# Patient Record
Sex: FEMALE | Race: WHITE | NOT HISPANIC OR LATINO | Employment: OTHER | URBAN - NONMETROPOLITAN AREA
[De-identification: names, ages, dates, MRNs, and addresses within clinical notes are randomized per-mention and may not be internally consistent; named-entity substitution may affect disease eponyms.]

---

## 2023-11-30 PROBLEM — H04.123 DRY EYES: Status: ACTIVE | Noted: 2023-07-21

## 2023-11-30 PROBLEM — E66.01 MORBID OBESITY DUE TO EXCESS CALORIES (MULTI): Status: ACTIVE | Noted: 2022-08-24

## 2023-11-30 PROBLEM — H35.3132 INTERMEDIATE STAGE NONEXUDATIVE AGE-RELATED MACULAR DEGENERATION OF BOTH EYES: Status: ACTIVE | Noted: 2023-07-21

## 2023-11-30 PROBLEM — J45.41 MODERATE PERSISTENT ASTHMA WITH EXACERBATION (HHS-HCC): Status: ACTIVE | Noted: 2023-06-16

## 2023-11-30 PROBLEM — Z94.7 HISTORY OF CORNEAL TRANSPLANT: Status: ACTIVE | Noted: 2023-11-17

## 2023-11-30 PROBLEM — M81.0 SENILE OSTEOPOROSIS: Status: ACTIVE | Noted: 2022-03-14

## 2023-11-30 PROBLEM — D25.9 FIBROID, UTERINE: Status: ACTIVE | Noted: 2023-06-16

## 2023-11-30 PROBLEM — E66.9 OBESITY: Status: ACTIVE | Noted: 2023-11-30

## 2023-11-30 PROBLEM — E78.5 HYPERLIPIDEMIA: Status: ACTIVE | Noted: 2017-04-24

## 2023-11-30 PROBLEM — F51.01 PRIMARY INSOMNIA: Status: ACTIVE | Noted: 2022-03-14

## 2023-11-30 PROBLEM — R53.82 CHRONIC FATIGUE: Status: ACTIVE | Noted: 2018-05-09

## 2023-11-30 PROBLEM — K64.9 HEMORRHOID: Status: ACTIVE | Noted: 2023-06-16

## 2023-11-30 PROBLEM — H18.513 FUCHS' CORNEAL DYSTROPHY OF BOTH EYES: Status: ACTIVE | Noted: 2023-07-21

## 2023-11-30 PROBLEM — E07.81 SICK-EUTHYROID SYNDROME: Status: ACTIVE | Noted: 2022-08-22

## 2023-11-30 PROBLEM — J30.2 SEASONAL ALLERGIC RHINITIS: Status: ACTIVE | Noted: 2023-06-16

## 2023-11-30 PROBLEM — J32.9 CHRONIC SINUSITIS: Status: ACTIVE | Noted: 2022-03-14

## 2023-11-30 PROBLEM — K21.9 GASTROESOPHAGEAL REFLUX DISEASE WITHOUT ESOPHAGITIS: Status: ACTIVE | Noted: 2022-03-14

## 2023-11-30 PROBLEM — Z96.1 PSEUDOPHAKIA: Status: ACTIVE | Noted: 2023-11-17

## 2023-11-30 PROBLEM — E55.9 VITAMIN D DEFICIENCY: Status: ACTIVE | Noted: 2023-06-16

## 2023-11-30 PROBLEM — E03.9 ACQUIRED HYPOTHYROIDISM: Status: ACTIVE | Noted: 2017-02-07

## 2023-11-30 PROBLEM — K58.2 IRRITABLE BOWEL SYNDROME WITH BOTH CONSTIPATION AND DIARRHEA: Status: ACTIVE | Noted: 2018-05-09

## 2023-11-30 PROBLEM — H25.13 AGE-RELATED NUCLEAR CATARACT OF BOTH EYES: Status: ACTIVE | Noted: 2023-07-21

## 2023-11-30 RX ORDER — MOMETASONE FUROATE AND FORMOTEROL FUMARATE DIHYDRATE 100; 5 UG/1; UG/1
2 AEROSOL RESPIRATORY (INHALATION) 2 TIMES DAILY
COMMUNITY
Start: 2018-11-20

## 2023-11-30 RX ORDER — OMEPRAZOLE 20 MG/1
1 CAPSULE, DELAYED RELEASE ORAL DAILY
COMMUNITY
Start: 2018-11-20 | End: 2023-12-01

## 2023-11-30 RX ORDER — CALCIUM CARBONATE 200(500)MG
TABLET,CHEWABLE ORAL
COMMUNITY
End: 2023-12-01

## 2023-11-30 RX ORDER — FLUTICASONE PROPIONATE AND SALMETEROL 500; 50 UG/1; UG/1
1 POWDER RESPIRATORY (INHALATION) 2 TIMES DAILY
COMMUNITY
Start: 2018-11-20

## 2023-11-30 RX ORDER — DILTIAZEM HYDROCHLORIDE 240 MG/1
240 CAPSULE, COATED, EXTENDED RELEASE ORAL DAILY
COMMUNITY
End: 2024-01-15 | Stop reason: SDUPTHER

## 2023-11-30 RX ORDER — VIT C/E/ZN/COPPR/LUTEIN/ZEAXAN 250MG-90MG
1000 CAPSULE ORAL
COMMUNITY
Start: 2018-11-20

## 2023-11-30 RX ORDER — LEVOTHYROXINE SODIUM 25 UG/1
1 TABLET ORAL 2 TIMES DAILY
COMMUNITY
Start: 2018-11-20

## 2023-11-30 RX ORDER — LEVALBUTEROL INHALATION SOLUTION 0.63 MG/3ML
1 SOLUTION RESPIRATORY (INHALATION)
COMMUNITY

## 2023-11-30 RX ORDER — LIOTHYRONINE SODIUM 5 UG/1
5 TABLET ORAL
COMMUNITY
Start: 2023-10-24

## 2023-12-01 ENCOUNTER — OFFICE VISIT (OUTPATIENT)
Dept: CARDIOLOGY | Facility: CLINIC | Age: 78
End: 2023-12-01
Payer: MEDICARE

## 2023-12-01 VITALS
HEIGHT: 64 IN | BODY MASS INDEX: 32.44 KG/M2 | HEART RATE: 80 BPM | SYSTOLIC BLOOD PRESSURE: 120 MMHG | DIASTOLIC BLOOD PRESSURE: 84 MMHG | WEIGHT: 190 LBS

## 2023-12-01 DIAGNOSIS — E66.9 CLASS 1 OBESITY WITHOUT SERIOUS COMORBIDITY WITH BODY MASS INDEX (BMI) OF 32.0 TO 32.9 IN ADULT, UNSPECIFIED OBESITY TYPE: ICD-10-CM

## 2023-12-01 DIAGNOSIS — E03.9 HYPOTHYROIDISM, UNSPECIFIED TYPE: Primary | ICD-10-CM

## 2023-12-01 DIAGNOSIS — I48.0 PAROXYSMAL ATRIAL FIBRILLATION (MULTI): ICD-10-CM

## 2023-12-01 DIAGNOSIS — E78.2 MIXED HYPERLIPIDEMIA: ICD-10-CM

## 2023-12-01 PROBLEM — E66.811 CLASS 1 OBESITY WITHOUT SERIOUS COMORBIDITY WITH BODY MASS INDEX (BMI) OF 32.0 TO 32.9 IN ADULT: Status: ACTIVE | Noted: 2023-11-30

## 2023-12-01 PROCEDURE — 99214 OFFICE O/P EST MOD 30 MIN: CPT | Performed by: INTERNAL MEDICINE

## 2023-12-01 PROCEDURE — 1159F MED LIST DOCD IN RCRD: CPT | Performed by: INTERNAL MEDICINE

## 2023-12-01 PROCEDURE — 1036F TOBACCO NON-USER: CPT | Performed by: INTERNAL MEDICINE

## 2023-12-01 RX ORDER — CYANOCOBALAMIN (VITAMIN B-12) 1000 MCG
200 TABLET, EXTENDED RELEASE ORAL DAILY
COMMUNITY

## 2023-12-01 RX ORDER — FLUTICASONE PROPIONATE 50 MCG
1 SPRAY, SUSPENSION (ML) NASAL DAILY
COMMUNITY

## 2023-12-01 RX ORDER — ACETAMINOPHEN 325 MG/1
TABLET ORAL EVERY 6 HOURS PRN
COMMUNITY

## 2023-12-01 RX ORDER — ZINC GLUCONATE 50 MG
50 TABLET ORAL DAILY
COMMUNITY

## 2023-12-01 RX ORDER — AZELASTINE 1 MG/ML
1 SPRAY, METERED NASAL 2 TIMES DAILY
COMMUNITY

## 2023-12-01 RX ORDER — MILK THISTLE 150 MG
1 CAPSULE ORAL DAILY
COMMUNITY

## 2023-12-01 RX ORDER — CALCIUM CARBONATE 300MG(750)
400 TABLET,CHEWABLE ORAL DAILY
COMMUNITY

## 2023-12-01 NOTE — PROGRESS NOTES
"Subjective   Alethea Poole is a 78 y.o. female       Chief Complaint    Follow-up          HPI   Patient is in the office for follow-up for the problems noted below.  She had recent left eye corneal transplant surgery.  Her asthma seems to be in remission presently.  She reports no symptoms of palpitations dyspnea or chest pain.  Her weight has dropped 12 pounds from last visit on purpose which was encouraged.  Her examination reveals regular rhythm which I believe is caused by atrial flutter with 4-1 AV conduction.  She is on Cardizem and Eliquis both of which have been well-tolerated.  Recent lab data from PCP were requested.    ASSESSMENT AND PLAN:      1. Paroxysmal atrial fibrillation status post radiofrequency ablation twice, the last in 2019 at [facility], patient has intermittent atrial flutter with variable AV conduction, we will continue rate control and anticoagulation with no plans for rhythm control.  2.  Class I obesity.  Her weight has been dropping nicely by lifestyle modification she made.  3. Controlled hypothyroidism, on medical therapy. Followed by Dr. russell in [place],   4. High-risk medication with Eliquis, no bleeding complications, CBC was requested from PCP  5. Asthma, currently stable and managed by pulmonary medicine        Review of Systems   All other systems reviewed and are negative.         Visit Vitals  /84 (BP Location: Left arm, Patient Position: Sitting)   Pulse 80   Ht 1.626 m (5' 4\")   Wt 86.2 kg (190 lb)   BMI 32.61 kg/m²   Smoking Status Former   BSA 1.97 m²        Objective   Physical Exam  Constitutional:       Appearance: Normal appearance. She is normal weight.   HENT:      Nose: Nose normal.   Neck:      Vascular: No carotid bruit.   Cardiovascular:      Rate and Rhythm: Normal rate.      Pulses: Normal pulses.      Heart sounds: Normal heart sounds.   Pulmonary:      Effort: Pulmonary effort is normal.   Abdominal:      General: Bowel sounds are " normal.      Palpations: Abdomen is soft.   Genitourinary:     Rectum: Normal.   Musculoskeletal:         General: Normal range of motion.      Cervical back: Normal range of motion.      Right lower leg: No edema.      Left lower leg: No edema.   Skin:     General: Skin is warm and dry.   Neurological:      General: No focal deficit present.      Mental Status: She is alert.   Psychiatric:         Mood and Affect: Mood normal.         Behavior: Behavior normal.         Thought Content: Thought content normal.         Judgment: Judgment normal.         Current Medications    Current Outpatient Medications:     acetaminophen (Tylenol) 325 mg tablet, Take by mouth every 6 hours if needed for mild pain (1 - 3)., Disp: , Rfl:     albuterol sulfate (Proair Digihaler) 90 mcg/actuation aero powdr breath act w/sensor inhaler, Inhale 2 puffs every 6 hours if needed for wheezing., Disp: , Rfl:     apixaban (Eliquis) 5 mg tablet, Take 1 tablet (5 mg) by mouth 2 times a day., Disp: , Rfl:     azelastine (Astelin) 137 mcg (0.1 %) nasal spray, Administer 1 spray into each nostril 2 times a day. Use in each nostril as directed, Disp: , Rfl:     cholecalciferol (Vitamin D-3) 25 MCG (1000 UT) capsule, Take 1 capsule (25 mcg) by mouth once daily., Disp: , Rfl:     dilTIAZem CD (Cardizem CD) 240 mg 24 hr capsule, Take 1 capsule (240 mg) by mouth once daily., Disp: , Rfl:     fluticasone (Flonase) 50 mcg/actuation nasal spray, Administer 1 spray into each nostril once daily. Shake gently. Before first use, prime pump. After use, clean tip and replace cap., Disp: , Rfl:     fluticasone propion-salmeteroL (Advair Diskus) 500-50 mcg/dose diskus inhaler, Inhale 1 puff twice a day., Disp: , Rfl:     levalbuterol (Xopenex) 0.63 mg/3 mL nebulizer solution, Take 1 ampule by nebulization 3 times a day., Disp: , Rfl:     levothyroxine (Synthroid, Levoxyl) 25 mcg tablet, Take 1 tablet (25 mcg) by mouth 2 times a day., Disp: , Rfl:      liothyronine (Cytomel) 5 mcg tablet, Take 1 tablet (5 mcg) by mouth 2 times a day with meals., Disp: , Rfl:     magnesium oxide (Mag-Ox) 400 mg tablet, Take 1 tablet (400 mg) by mouth once daily., Disp: , Rfl:     mometasone-formoterol (Dulera) 100-5 mcg/actuation inhaler, Inhale 2 puffs 2 times a day., Disp: , Rfl:     quercetin 500 mg capsule, Take 1 tablet by mouth once daily., Disp: , Rfl:     selenium 200 mcg capsule, Take 200 mg by mouth once daily., Disp: , Rfl:     sod chlorid/B6/zinc/citric acd (NACL-ZN AC-VIT B6-CITRIC ACID IR), Apply topically., Disp: , Rfl:     zinc gluconate 50 mg tablet, Take 1 tablet (50 mg of elemental zinc) by mouth once daily., Disp: , Rfl:                      Assessment/Plan   1. Paroxysmal atrial fibrillation (CMS/HCC)  Follow Up In Cardiology      2. Mixed hyperlipidemia        3. Class 1 obesity without serious comorbidity with body mass index (BMI) of 32.0 to 32.9 in adult, unspecified obesity type

## 2024-01-15 DIAGNOSIS — I48.0 PAROXYSMAL ATRIAL FIBRILLATION (MULTI): ICD-10-CM

## 2024-01-16 RX ORDER — DILTIAZEM HYDROCHLORIDE 240 MG/1
240 CAPSULE, COATED, EXTENDED RELEASE ORAL DAILY
Qty: 90 CAPSULE | Refills: 3 | Status: SHIPPED | OUTPATIENT
Start: 2024-01-16 | End: 2025-01-15

## 2024-02-23 DIAGNOSIS — I48.0 PAROXYSMAL ATRIAL FIBRILLATION (MULTI): ICD-10-CM

## 2024-02-26 RX ORDER — APIXABAN 5 MG/1
5 TABLET, FILM COATED ORAL 2 TIMES DAILY
Qty: 180 TABLET | Refills: 3 | Status: SHIPPED | OUTPATIENT
Start: 2024-02-26

## 2024-06-14 ENCOUNTER — APPOINTMENT (OUTPATIENT)
Dept: CARDIOLOGY | Facility: CLINIC | Age: 79
End: 2024-06-14
Payer: MEDICARE

## 2024-06-14 VITALS
BODY MASS INDEX: 31.76 KG/M2 | DIASTOLIC BLOOD PRESSURE: 88 MMHG | SYSTOLIC BLOOD PRESSURE: 138 MMHG | HEIGHT: 64 IN | WEIGHT: 186 LBS | HEART RATE: 82 BPM

## 2024-06-14 DIAGNOSIS — Z79.899 HIGH RISK MEDICATION USE: ICD-10-CM

## 2024-06-14 DIAGNOSIS — E66.9 OBESITY, CLASS I, BMI 30-34.9: ICD-10-CM

## 2024-06-14 DIAGNOSIS — I48.0 PAROXYSMAL ATRIAL FIBRILLATION (MULTI): Primary | ICD-10-CM

## 2024-06-14 DIAGNOSIS — J45.20 MILD INTERMITTENT ASTHMA WITHOUT COMPLICATION (HHS-HCC): ICD-10-CM

## 2024-06-14 DIAGNOSIS — Z78.9 NEVER SMOKED TOBACCO: ICD-10-CM

## 2024-06-14 PROCEDURE — 1159F MED LIST DOCD IN RCRD: CPT | Performed by: INTERNAL MEDICINE

## 2024-06-14 PROCEDURE — 99214 OFFICE O/P EST MOD 30 MIN: CPT | Performed by: INTERNAL MEDICINE

## 2024-06-14 PROCEDURE — 1036F TOBACCO NON-USER: CPT | Performed by: INTERNAL MEDICINE

## 2024-06-14 RX ORDER — BUDESONIDE AND FORMOTEROL FUMARATE DIHYDRATE 160; 4.5 UG/1; UG/1
AEROSOL RESPIRATORY (INHALATION)
COMMUNITY
Start: 2023-10-04

## 2024-06-14 RX ORDER — KETOROLAC TROMETHAMINE 5 MG/ML
SOLUTION OPHTHALMIC
COMMUNITY
Start: 2023-11-22

## 2024-06-14 RX ORDER — PREDNISOLONE ACETATE 10 MG/ML
SUSPENSION/ DROPS OPHTHALMIC
COMMUNITY
Start: 2023-10-24

## 2024-06-14 ASSESSMENT — ENCOUNTER SYMPTOMS: PALPITATIONS: 1

## 2024-06-14 NOTE — LETTER
June 14, 2024     Jennie Diggs MD  [address]  [facility] [address]    Patient: Alethea Poole   YOB: 1945   Date of Visit: 6/14/2024       Dear Dr. Jennie Diggs MD:    Thank you for referring Alethea Poole to me for evaluation. Below are my notes for this consultation.  If you have questions, please do not hesitate to call me. I look forward to following your patient along with you.       Sincerely,     Russell Matos MD      CC: No Recipients  ______________________________________________________________________________________    Subjective   Alethea Poole is a 79 y.o. female       Chief Complaint    Follow-up          HPI   Patient is in the office for follow-up for the problems noted below.  Since her last visit she stopped taking diltiazem because it made her feel very tired.  Her heart rhythm is under control.  She remains on Eliquis 5 mg twice daily with no bleeding complications.  Her asthma is stable and she uses inhaler only on as-needed basis.  Her thyroid is under control managed by her PCP.  She had no bleeding complications and no neurological events.  Her weight is unchanged from baseline and remains class I obesity.    ASSESSMENT AND PLAN:      1. Paroxysmal atrial fibrillation/flutter status post radiofrequency ablation twice, the last in 2019 at [facility], patient has intermittent atrial flutter with variable AV conduction, we will continue rate control and anticoagulation with no plans for rhythm control.  Currently heart rate is controlled without diltiazem or heart rate control agent  2.  Class I obesity.  Her weight has been dropping nicely by lifestyle modification she made.  3.  hypothyroidism, on medical therapy. Followed by Dr. russell in [place],   4. High-risk medication with Eliquis, no bleeding complications, CBC was requested from PCP  5. Asthma, currently stable and managed by pulmonary medicine     Review of Systems  "  Cardiovascular:  Positive for palpitations.   All other systems reviewed and are negative.           Vitals:    06/14/24 0951   BP: 138/88   BP Location: Left arm   Patient Position: Sitting   Pulse: 82   Weight: 84.4 kg (186 lb)   Height: 1.626 m (5' 4\")        Objective   Physical Exam  Constitutional:       Appearance: Normal appearance.   HENT:      Nose: Nose normal.   Neck:      Vascular: No carotid bruit.   Cardiovascular:      Rate and Rhythm: Normal rate.      Pulses: Normal pulses.      Heart sounds: Normal heart sounds.   Pulmonary:      Effort: Pulmonary effort is normal.   Abdominal:      General: Bowel sounds are normal.      Palpations: Abdomen is soft.   Musculoskeletal:         General: Normal range of motion.      Cervical back: Normal range of motion.      Right lower leg: No edema.      Left lower leg: No edema.   Skin:     General: Skin is warm and dry.   Neurological:      General: No focal deficit present.      Mental Status: She is alert.   Psychiatric:         Mood and Affect: Mood normal.         Behavior: Behavior normal.         Thought Content: Thought content normal.         Judgment: Judgment normal.         Allergies  Penicillins and Wheat bran     Current Medications    Current Outpatient Medications:   •  acetaminophen (Tylenol) 325 mg tablet, Take by mouth every 6 hours if needed for mild pain (1 - 3)., Disp: , Rfl:   •  albuterol sulfate (Proair Digihaler) 90 mcg/actuation aero powdr breath act w/sensor inhaler, Inhale 2 puffs every 6 hours if needed for wheezing., Disp: , Rfl:   •  azelastine (Astelin) 137 mcg (0.1 %) nasal spray, Administer 1 spray into each nostril 2 times a day. Use in each nostril as directed, Disp: , Rfl:   •  budesonide-formoteroL (Symbicort) 160-4.5 mcg/actuation inhaler, , Disp: , Rfl:   •  cholecalciferol (Vitamin D-3) 25 MCG (1000 UT) capsule, Take 1 capsule (25 mcg) by mouth once daily., Disp: , Rfl:   •  Eliquis 5 mg tablet, TAKE 1 TABLET TWICE A " DAY, Disp: 180 tablet, Rfl: 3  •  fluticasone (Flonase) 50 mcg/actuation nasal spray, Administer 1 spray into each nostril once daily. Shake gently. Before first use, prime pump. After use, clean tip and replace cap., Disp: , Rfl:   •  fluticasone propion-salmeteroL (Advair Diskus) 500-50 mcg/dose diskus inhaler, Inhale 1 puff twice a day., Disp: , Rfl:   •  ketorolac (Acular) 0.5 % ophthalmic solution, place 1 drop INTO AFFECTED EYE(S) every morning and BEFORE BEDTIME, Disp: , Rfl:   •  levalbuterol (Xopenex) 0.63 mg/3 mL nebulizer solution, Take 1 ampule by nebulization 3 times a day., Disp: , Rfl:   •  levothyroxine (Synthroid, Levoxyl) 25 mcg tablet, Take 1 tablet (25 mcg) by mouth 2 times a day., Disp: , Rfl:   •  liothyronine (Cytomel) 5 mcg tablet, Take 1 tablet (5 mcg) by mouth. Take 1 in the am 1/2 in the pm, Disp: , Rfl:   •  magnesium oxide (Mag-Ox) 400 mg tablet, Take 1 tablet (400 mg) by mouth once daily., Disp: , Rfl:   •  prednisoLONE acetate (Pred-Forte) 1 % ophthalmic suspension, place 1 drop into both eyes every morning and AT NOON and every e...  (REFER TO PRESCRIPTION NOTES)., Disp: , Rfl:   •  quercetin 500 mg capsule, Take 1 tablet by mouth once daily., Disp: , Rfl:   •  selenium 200 mcg capsule, Take 200 mg by mouth once daily., Disp: , Rfl:   •  sod chlorid/B6/zinc/citric acd (NACL-ZN AC-VIT B6-CITRIC ACID IR), Apply topically., Disp: , Rfl:   •  zinc gluconate 50 mg tablet, Take 1 tablet (50 mg of elemental zinc) by mouth once daily., Disp: , Rfl:                      Assessment/Plan   1. Paroxysmal atrial fibrillation (Multi)  Follow Up In Cardiology    Follow Up In Cardiology      2. Adult BMI 31.0-31.9 kg/sq m        3. Never smoked tobacco        4. Mild intermittent asthma without complication (HHS-HCC)        5. High risk medication use        6. Obesity, Class I, BMI 30-34.9                 Scribe Attestation  By signing my name below, I, Chelsi POWELL LPN   , Scribe   attest that this  documentation has been prepared under the direction and in the presence of Russell Matos MD.     Provider Attestation - Scribe documentation    All medical record entries made by the Scribe were at my direction and personally dictated by me. I have reviewed the chart and agree that the record accurately reflects my personal performance of the history, physical exam, discussion and plan.

## 2024-06-14 NOTE — PROGRESS NOTES
"Subjective   Alethea Poole is a 79 y.o. female       Chief Complaint    Follow-up          HPI   Patient is in the office for follow-up for the problems noted below.  Since her last visit she stopped taking diltiazem because it made her feel very tired.  Her heart rhythm is under control.  She remains on Eliquis 5 mg twice daily with no bleeding complications.  Her asthma is stable and she uses inhaler only on as-needed basis.  Her thyroid is under control managed by her PCP.  She had no bleeding complications and no neurological events.  Her weight is unchanged from baseline and remains class I obesity.    ASSESSMENT AND PLAN:      1. Paroxysmal atrial fibrillation/flutter status post radiofrequency ablation twice, the last in 2019 at [facility], patient has intermittent atrial flutter with variable AV conduction, we will continue rate control and anticoagulation with no plans for rhythm control.  Currently heart rate is controlled without diltiazem or heart rate control agent  2.  Class I obesity.  Her weight has been dropping nicely by lifestyle modification she made.  3.  hypothyroidism, on medical therapy. Followed by Dr. russell in [place],   4. High-risk medication with Eliquis, no bleeding complications, CBC was requested from PCP  5. Asthma, currently stable and managed by pulmonary medicine     Review of Systems   Cardiovascular:  Positive for palpitations.   All other systems reviewed and are negative.           Vitals:    06/14/24 0951   BP: 138/88   BP Location: Left arm   Patient Position: Sitting   Pulse: 82   Weight: 84.4 kg (186 lb)   Height: 1.626 m (5' 4\")        Objective   Physical Exam  Constitutional:       Appearance: Normal appearance.   HENT:      Nose: Nose normal.   Neck:      Vascular: No carotid bruit.   Cardiovascular:      Rate and Rhythm: Normal rate.      Pulses: Normal pulses.      Heart sounds: Normal heart sounds.   Pulmonary:      Effort: Pulmonary effort is " normal.   Abdominal:      General: Bowel sounds are normal.      Palpations: Abdomen is soft.   Musculoskeletal:         General: Normal range of motion.      Cervical back: Normal range of motion.      Right lower leg: No edema.      Left lower leg: No edema.   Skin:     General: Skin is warm and dry.   Neurological:      General: No focal deficit present.      Mental Status: She is alert.   Psychiatric:         Mood and Affect: Mood normal.         Behavior: Behavior normal.         Thought Content: Thought content normal.         Judgment: Judgment normal.         Allergies  Penicillins and Wheat bran     Current Medications    Current Outpatient Medications:     acetaminophen (Tylenol) 325 mg tablet, Take by mouth every 6 hours if needed for mild pain (1 - 3)., Disp: , Rfl:     albuterol sulfate (Proair Digihaler) 90 mcg/actuation aero powdr breath act w/sensor inhaler, Inhale 2 puffs every 6 hours if needed for wheezing., Disp: , Rfl:     azelastine (Astelin) 137 mcg (0.1 %) nasal spray, Administer 1 spray into each nostril 2 times a day. Use in each nostril as directed, Disp: , Rfl:     budesonide-formoteroL (Symbicort) 160-4.5 mcg/actuation inhaler, , Disp: , Rfl:     cholecalciferol (Vitamin D-3) 25 MCG (1000 UT) capsule, Take 1 capsule (25 mcg) by mouth once daily., Disp: , Rfl:     Eliquis 5 mg tablet, TAKE 1 TABLET TWICE A DAY, Disp: 180 tablet, Rfl: 3    fluticasone (Flonase) 50 mcg/actuation nasal spray, Administer 1 spray into each nostril once daily. Shake gently. Before first use, prime pump. After use, clean tip and replace cap., Disp: , Rfl:     fluticasone propion-salmeteroL (Advair Diskus) 500-50 mcg/dose diskus inhaler, Inhale 1 puff twice a day., Disp: , Rfl:     ketorolac (Acular) 0.5 % ophthalmic solution, place 1 drop INTO AFFECTED EYE(S) every morning and BEFORE BEDTIME, Disp: , Rfl:     levalbuterol (Xopenex) 0.63 mg/3 mL nebulizer solution, Take 1 ampule by nebulization 3 times a day.,  Disp: , Rfl:     levothyroxine (Synthroid, Levoxyl) 25 mcg tablet, Take 1 tablet (25 mcg) by mouth 2 times a day., Disp: , Rfl:     liothyronine (Cytomel) 5 mcg tablet, Take 1 tablet (5 mcg) by mouth. Take 1 in the am 1/2 in the pm, Disp: , Rfl:     magnesium oxide (Mag-Ox) 400 mg tablet, Take 1 tablet (400 mg) by mouth once daily., Disp: , Rfl:     prednisoLONE acetate (Pred-Forte) 1 % ophthalmic suspension, place 1 drop into both eyes every morning and AT NOON and every e...  (REFER TO PRESCRIPTION NOTES)., Disp: , Rfl:     quercetin 500 mg capsule, Take 1 tablet by mouth once daily., Disp: , Rfl:     selenium 200 mcg capsule, Take 200 mg by mouth once daily., Disp: , Rfl:     sod chlorid/B6/zinc/citric acd (NACL-ZN AC-VIT B6-CITRIC ACID IR), Apply topically., Disp: , Rfl:     zinc gluconate 50 mg tablet, Take 1 tablet (50 mg of elemental zinc) by mouth once daily., Disp: , Rfl:                      Assessment/Plan   1. Paroxysmal atrial fibrillation (Multi)  Follow Up In Cardiology    Follow Up In Cardiology      2. Adult BMI 31.0-31.9 kg/sq m        3. Never smoked tobacco        4. Mild intermittent asthma without complication (HHS-HCC)        5. High risk medication use        6. Obesity, Class I, BMI 30-34.9                 Scribe Attestation  By signing my name below, Chelsi ABRAHAM LPN, Scribe   attest that this documentation has been prepared under the direction and in the presence of Russell Matos MD.     Provider Attestation - Scribe documentation    All medical record entries made by the Scribe were at my direction and personally dictated by me. I have reviewed the chart and agree that the record accurately reflects my personal performance of the history, physical exam, discussion and plan.

## 2024-06-14 NOTE — PATIENT INSTRUCTIONS
Please bring all medicines, vitamins, and herbal supplements with you when you come to the office.    Prescriptions will not be filled unless you are compliant with your follow up appointments or have a follow up appointment scheduled as per instruction of your physician. Refills should be requested at the time of your visit.     BMI was above normal measurement. Current weight: 84.4 kg (186 lb)  Weight change since last visit (-) denotes wt loss -4 lbs   Weight loss needed to achieve BMI 25: 40.7 Lbs  Weight loss needed to achieve BMI 30: 11.6 Lbs  Provided instructions on dietary changes.

## 2025-01-13 DIAGNOSIS — I48.0 PAROXYSMAL ATRIAL FIBRILLATION (MULTI): ICD-10-CM

## 2025-01-14 RX ORDER — APIXABAN 5 MG/1
5 TABLET, FILM COATED ORAL 2 TIMES DAILY
Qty: 180 TABLET | Refills: 3 | Status: SHIPPED | OUTPATIENT
Start: 2025-01-14

## 2025-02-19 ENCOUNTER — APPOINTMENT (OUTPATIENT)
Dept: CARDIOLOGY | Facility: CLINIC | Age: 80
End: 2025-02-19
Payer: MEDICARE

## 2025-05-15 ENCOUNTER — APPOINTMENT (OUTPATIENT)
Dept: CARDIOLOGY | Facility: CLINIC | Age: 80
End: 2025-05-15
Payer: MEDICARE

## 2025-05-15 VITALS
WEIGHT: 183 LBS | DIASTOLIC BLOOD PRESSURE: 86 MMHG | BODY MASS INDEX: 31.24 KG/M2 | HEIGHT: 64 IN | SYSTOLIC BLOOD PRESSURE: 130 MMHG | HEART RATE: 66 BPM

## 2025-05-15 DIAGNOSIS — E03.9 HYPOTHYROIDISM, UNSPECIFIED TYPE: Primary | ICD-10-CM

## 2025-05-15 DIAGNOSIS — Z78.9 NEVER SMOKED TOBACCO: ICD-10-CM

## 2025-05-15 DIAGNOSIS — I48.92 ATRIAL FLUTTER, UNSPECIFIED TYPE (MULTI): ICD-10-CM

## 2025-05-15 DIAGNOSIS — I48.0 PAROXYSMAL ATRIAL FIBRILLATION (MULTI): ICD-10-CM

## 2025-05-15 DIAGNOSIS — J45.41 MODERATE PERSISTENT ASTHMA WITH EXACERBATION (HHS-HCC): ICD-10-CM

## 2025-05-15 DIAGNOSIS — Z79.899 HIGH RISK MEDICATION USE: ICD-10-CM

## 2025-05-15 PROBLEM — I48.4 ATYPICAL ATRIAL FLUTTER: Status: RESOLVED | Noted: 2025-05-15 | Resolved: 2025-05-15

## 2025-05-15 PROBLEM — I48.4 ATYPICAL ATRIAL FLUTTER: Status: ACTIVE | Noted: 2025-05-15

## 2025-05-15 PROCEDURE — 1036F TOBACCO NON-USER: CPT | Performed by: INTERNAL MEDICINE

## 2025-05-15 PROCEDURE — 1159F MED LIST DOCD IN RCRD: CPT | Performed by: INTERNAL MEDICINE

## 2025-05-15 PROCEDURE — 99213 OFFICE O/P EST LOW 20 MIN: CPT | Performed by: INTERNAL MEDICINE

## 2025-05-15 PROCEDURE — 93000 ELECTROCARDIOGRAM COMPLETE: CPT | Performed by: INTERNAL MEDICINE

## 2025-05-15 RX ORDER — LEVOTHYROXINE SODIUM 75 UG/1
25 TABLET ORAL
COMMUNITY
Start: 2025-04-24

## 2025-05-15 RX ORDER — IBUPROFEN 100 MG/5ML
1000 SUSPENSION, ORAL (FINAL DOSE FORM) ORAL DAILY
COMMUNITY
Start: 2023-11-09

## 2025-05-15 NOTE — PROGRESS NOTES
"HPI  Patient is in the office for follow-up for permanent atrial fibrillation/flutter.  She has no reported palpitations or dyspnea.  She is only taking Eliquis 5 mg twice daily as her only cardiac medication.  EKG today showed atrial flutter with 4:1 AV conduction given the impression on physical examination for regular rhythm.  She has asthma which has been for the most part in remission and does not see pulmonary medicine.  She had previous ablations and has no desire to go back for another ablation for the flutter.  The pathophysiology of atrial flutter was explained to the patient.    ASSESSMENT AND PLAN:      1. Paroxysmal atrial fibrillation/flutter status post radiofrequency ablation twice, the last in 2019 at [facility], patient has now atrial flutter with 4:1 AV conduction confirmed by EKG today, it gives the impression that patient has a regular rhythm.  Continue Eliquis, she has no desire for another ablation  2.  Class I obesity.  Her weight has been dropping nicely by lifestyle modification she made.  3.  hypothyroidism, on medical therapy. Followed by Dr. russell in [place],   4. High-risk medication with Eliquis, no bleeding complications, CBC was requested from PCP  5. Asthma, currently stable and managed by pulmonary medicine     Review of Systems   All other systems reviewed and are negative.           Vitals:    05/15/25 0953   BP: 130/86   BP Location: Left arm   Patient Position: Sitting   Pulse: 66   Weight: 83 kg (183 lb)   Height: 1.626 m (5' 4\")        Objective   Physical Exam  Constitutional:       Appearance: Normal appearance.   HENT:      Nose: Nose normal.   Neck:      Vascular: No carotid bruit.   Cardiovascular:      Rate and Rhythm: Normal rate.      Pulses: Normal pulses.      Heart sounds: Normal heart sounds.   Pulmonary:      Effort: Pulmonary effort is normal.   Abdominal:      General: Bowel sounds are normal.      Palpations: Abdomen is soft.   Musculoskeletal:    "      General: Normal range of motion.      Cervical back: Normal range of motion.      Right lower leg: No edema.      Left lower leg: No edema.   Skin:     General: Skin is warm and dry.   Neurological:      General: No focal deficit present.      Mental Status: She is alert.   Psychiatric:         Mood and Affect: Mood normal.         Behavior: Behavior normal.         Thought Content: Thought content normal.         Judgment: Judgment normal.         Allergies  Penicillins and Wheat bran     Current Medications  Current Outpatient Medications   Medication Instructions    acetaminophen (Tylenol) 325 mg tablet Every 6 hours PRN    albuterol sulfate (Proair Digihaler) 90 mcg/actuation aero powdr breath act w/sensor inhaler 2 puffs, Every 6 hours PRN    ascorbic acid (VITAMIN C) 1,000 mg, Daily    azelastine (Astelin) 137 mcg (0.1 %) nasal spray 1 spray, 2 times daily    budesonide-formoteroL (Symbicort) 160-4.5 mcg/actuation inhaler     cholecalciferol (VITAMIN D-3) 1,000 Units, Daily RT    Eliquis 5 mg, oral, 2 times daily    fluticasone (Flonase) 50 mcg/actuation nasal spray 1 spray, Daily    levalbuterol (Xopenex) 0.63 mg/3 mL nebulizer solution 1 ampule, 3 times daily RT    levothyroxine (SYNTHROID, LEVOXYL) 25 mcg, Daily before breakfast    magnesium oxide (MAG-OX) 400 mg, Daily    NON FORMULARY Apple Pectin daily    prednisoLONE acetate (Pred-Forte) 1 % ophthalmic suspension place 1 drop into both eyes every morning and AT NOON and every e...  (REFER TO PRESCRIPTION NOTES).    quercetin 500 mg capsule 1 tablet, Daily    sod chlorid/B6/zinc/citric acd (NACL-ZN AC-VIT B6-CITRIC ACID IR) Apply topically.    zinc gluconate 50 mg tablet 50 mg of elemental zinc, Daily                        Assessment/Plan   1. Hypothyroidism, unspecified type        2. Paroxysmal atrial fibrillation (Multi)  Follow Up In Cardiology    ECG 12 Lead      3. Atrial flutter, unspecified type (Multi)  ECG 12 Lead    Follow Up In  Cardiology      4. High risk medication use        5. Moderate persistent asthma with exacerbation ([facility]-HCC)        6. Never smoked tobacco                 Scribe Attestation  By signing my name below, IChelsi LPN, Scribe   attest that this documentation has been prepared under the direction and in the presence of Russell Matos MD.     Provider Attestation - Scribe documentation    All medical record entries made by the Scribe were at my direction and personally dictated by me. I have reviewed the chart and agree that the record accurately reflects my personal performance of the history, physical exam, discussion and plan.

## 2025-05-15 NOTE — LETTER
May 15, 2025     Jennie Diggs MD   [address]  [facility] [id number]    Patient: Alethea Poole   YOB: 1945   Date of Visit: 5/15/2025       Dear Dr. Jennie Digsg MD:    Thank you for referring Alethea Poole to me for evaluation. Below are my notes for this consultation.  If you have questions, please do not hesitate to call me. I look forward to following your patient along with you.       Sincerely,     Russell Matos MD      CC: No Recipients  ______________________________________________________________________________________    HPI  Patient is in the office for follow-up for permanent atrial fibrillation/flutter.  She has no reported palpitations or dyspnea.  She is only taking Eliquis 5 mg twice daily as her only cardiac medication.  EKG today showed atrial flutter with 4:1 AV conduction given the impression on physical examination for regular rhythm.  She has asthma which has been for the most part in remission and does not see pulmonary medicine.  She had previous ablations and has no desire to go back for another ablation for the flutter.  The pathophysiology of atrial flutter was explained to the patient.    ASSESSMENT AND PLAN:      1. Paroxysmal atrial fibrillation/flutter status post radiofrequency ablation twice, the last in 2019 at [facility], patient has now atrial flutter with 4:1 AV conduction confirmed by EKG today, it gives the impression that patient has a regular rhythm.  Continue Eliquis, she has no desire for another ablation  2.  Class I obesity.  Her weight has been dropping nicely by lifestyle modification she made.  3.  hypothyroidism, on medical therapy. Followed by Dr. russell in [place],   4. High-risk medication with Eliquis, no bleeding complications, CBC was requested from PCP  5. Asthma, currently stable and managed by pulmonary medicine     Review of Systems   All other systems reviewed and are negative.           Vitals:    05/15/25  "0953   BP: 130/86   BP Location: Left arm   Patient Position: Sitting   Pulse: 66   Weight: 83 kg (183 lb)   Height: 1.626 m (5' 4\")        Objective   Physical Exam  Constitutional:       Appearance: Normal appearance.   HENT:      Nose: Nose normal.   Neck:      Vascular: No carotid bruit.   Cardiovascular:      Rate and Rhythm: Normal rate.      Pulses: Normal pulses.      Heart sounds: Normal heart sounds.   Pulmonary:      Effort: Pulmonary effort is normal.   Abdominal:      General: Bowel sounds are normal.      Palpations: Abdomen is soft.   Musculoskeletal:         General: Normal range of motion.      Cervical back: Normal range of motion.      Right lower leg: No edema.      Left lower leg: No edema.   Skin:     General: Skin is warm and dry.   Neurological:      General: No focal deficit present.      Mental Status: She is alert.   Psychiatric:         Mood and Affect: Mood normal.         Behavior: Behavior normal.         Thought Content: Thought content normal.         Judgment: Judgment normal.         Allergies  Penicillins and Wheat bran     Current Medications  Current Outpatient Medications   Medication Instructions   • acetaminophen (Tylenol) 325 mg tablet Every 6 hours PRN   • albuterol sulfate (Proair Digihaler) 90 mcg/actuation aero powdr breath act w/sensor inhaler 2 puffs, Every 6 hours PRN   • ascorbic acid (VITAMIN C) 1,000 mg, Daily   • azelastine (Astelin) 137 mcg (0.1 %) nasal spray 1 spray, 2 times daily   • budesonide-formoteroL (Symbicort) 160-4.5 mcg/actuation inhaler    • cholecalciferol (VITAMIN D-3) 1,000 Units, Daily RT   • Eliquis 5 mg, oral, 2 times daily   • fluticasone (Flonase) 50 mcg/actuation nasal spray 1 spray, Daily   • levalbuterol (Xopenex) 0.63 mg/3 mL nebulizer solution 1 ampule, 3 times daily RT   • levothyroxine (SYNTHROID, LEVOXYL) 25 mcg, Daily before breakfast   • magnesium oxide (MAG-OX) 400 mg, Daily   • NON FORMULARY Apple Pectin daily   • prednisoLONE " acetate (Pred-Forte) 1 % ophthalmic suspension place 1 drop into both eyes every morning and AT NOON and every e...  (REFER TO PRESCRIPTION NOTES).   • quercetin 500 mg capsule 1 tablet, Daily   • sod chlorid/B6/zinc/citric acd (NACL-ZN AC-VIT B6-CITRIC ACID IR) Apply topically.   • zinc gluconate 50 mg tablet 50 mg of elemental zinc, Daily                        Assessment/Plan   1. Hypothyroidism, unspecified type        2. Paroxysmal atrial fibrillation (Multi)  Follow Up In Cardiology    ECG 12 Lead      3. Atrial flutter, unspecified type (Multi)  ECG 12 Lead    Follow Up In Cardiology      4. High risk medication use        5. Moderate persistent asthma with exacerbation ([facility])        6. Never smoked tobacco                 Scribe Attestation  By signing my name below, IChelsi LPN, Scribe   attest that this documentation has been prepared under the direction and in the presence of Russell Matos MD.     Provider Attestation - Scribe documentation    All medical record entries made by the Scribe were at my direction and personally dictated by me. I have reviewed the chart and agree that the record accurately reflects my personal performance of the history, physical exam, discussion and plan.

## 2025-05-15 NOTE — PATIENT INSTRUCTIONS
Please bring all medicines, vitamins, and herbal supplements with you when you come to the office.    Prescriptions will not be filled unless you are compliant with your follow up appointments or have a follow up appointment scheduled as per instruction of your physician. Refills should be requested at the time of your visit.     BMI was above normal measurement. Current weight: 83 kg (183 lb)  Weight change since last visit (-) denotes wt loss -3 lbs   Weight loss needed to achieve BMI 25: 37.7 Lbs  Weight loss needed to achieve BMI 30: 8.6 Lbs  Provided instructions on dietary changes  Provided instructions on exercise.    6 months  Same medications

## 2026-01-21 ENCOUNTER — APPOINTMENT (OUTPATIENT)
Dept: CARDIOLOGY | Facility: CLINIC | Age: 81
End: 2026-01-21
Payer: MEDICARE